# Patient Record
Sex: FEMALE | URBAN - METROPOLITAN AREA
[De-identification: names, ages, dates, MRNs, and addresses within clinical notes are randomized per-mention and may not be internally consistent; named-entity substitution may affect disease eponyms.]

---

## 2022-08-02 ENCOUNTER — LAB REQUISITION (OUTPATIENT)
Dept: LAB | Age: 32
End: 2022-08-02

## 2022-08-02 PROCEDURE — 86480 TB TEST CELL IMMUN MEASURE: CPT | Performed by: CLINICAL MEDICAL LABORATORY

## 2022-08-02 PROCEDURE — PSEU9049 QUANTIFERON TB PLUS: Performed by: CLINICAL MEDICAL LABORATORY

## 2022-08-04 LAB
GAMMA INTERFERON BACKGROUND BLD IA-ACNC: 0.12 IU/ML
M TB IFN-G BLD-IMP: POSITIVE
M TB IFN-G CD4+ BCKGRND COR BLD-ACNC: 1.09 IU/ML
M TB IFN-G CD4+CD8+ BCKGRND COR BLD-ACNC: 1.1 IU/ML
MITOGEN IGNF BCKGRD COR BLD-ACNC: >10 IU/ML

## 2025-04-10 ENCOUNTER — HOSPITAL ENCOUNTER (OUTPATIENT)
Age: 35
Discharge: HOME | End: 2025-04-10
Payer: COMMERCIAL

## 2025-04-10 VITALS
HEIGHT: 65 IN | OXYGEN SATURATION: 98 % | HEART RATE: 97 BPM | BODY MASS INDEX: 43.32 KG/M2 | SYSTOLIC BLOOD PRESSURE: 144 MMHG | RESPIRATION RATE: 16 BRPM | WEIGHT: 260 LBS | DIASTOLIC BLOOD PRESSURE: 86 MMHG | TEMPERATURE: 99 F

## 2025-04-10 DIAGNOSIS — N61.0 CELLULITIS OF LEFT BREAST: Primary | ICD-10-CM

## 2025-04-10 LAB — B-HCG UR QL: NEGATIVE

## 2025-04-10 PROCEDURE — 96365 THER/PROPH/DIAG IV INF INIT: CPT

## 2025-04-10 PROCEDURE — 96375 TX/PRO/DX INJ NEW DRUG ADDON: CPT

## 2025-04-10 PROCEDURE — 81025 URINE PREGNANCY TEST: CPT

## 2025-04-10 PROCEDURE — 99204 OFFICE O/P NEW MOD 45 MIN: CPT

## 2025-04-10 RX ORDER — CEPHALEXIN 500 MG/1
500 CAPSULE ORAL 4 TIMES DAILY
Qty: 40 CAPSULE | Refills: 0 | Status: SHIPPED | OUTPATIENT
Start: 2025-04-10 | End: 2025-04-20

## 2025-04-10 RX ORDER — KETOROLAC TROMETHAMINE 30 MG/ML
15 INJECTION, SOLUTION INTRAMUSCULAR; INTRAVENOUS ONCE
Status: COMPLETED | OUTPATIENT
Start: 2025-04-10 | End: 2025-04-10

## 2025-04-10 RX ORDER — CHLORHEXIDINE GLUCONATE 40 MG/ML
30 SOLUTION TOPICAL
Qty: 1 EACH | Refills: 0 | Status: SHIPPED | OUTPATIENT
Start: 2025-04-10

## 2025-04-10 RX ORDER — ALBUTEROL SULFATE 90 UG/1
INHALANT RESPIRATORY (INHALATION) EVERY 6 HOURS PRN
COMMUNITY

## 2025-04-10 RX ORDER — CLONAZEPAM 0.5 MG/1
0.5 TABLET ORAL AS NEEDED
COMMUNITY
Start: 2023-04-06

## 2025-04-10 NOTE — ED PROVIDER NOTES
Patient Seen in: [facility]    History     Chief Complaint   Patient presents with    Breast Problem     Stated Complaint: Breast Lump    Subjective:   HPI    35-year-old female here with complaint of redness and swelling to an aspect of her right breast that started on Monday.  Patient denies any injury trauma to the area.  Patient denies any breast-feeding etc.  Patient states that her  grandmother had breast  cancer in her 60s and her aunt had breast cancer in her 40s.  Patient was tested for BRCA gene which was negative.  Patient denies chest pain, shortness of breath, cough, abdominal pain, nausea, vomiting or diarrhea.  Patient is tolerating p.o. speaking full sentences.  Afebrile.      Objective:     Past Medical History:    Anxiety    Asthma (HCC)              History reviewed. No pertinent surgical history.           The patient's medication list, past medical history and social history elements  as listed in today's nurse's notes are reviewed and agree.   The patient's family history is reviewed and is noncontributory to the presenting problem, except as indicated as above.     Social History     Socioeconomic History    Marital status:    Tobacco Use    Smoking status: Never    Smokeless tobacco: Never   Vaping Use    Vaping status: Never Used   Substance and Sexual Activity    Alcohol use: Yes     Comment: social    Drug use: Never              Review of Systems    Positive for stated complaint: Breast Lump  Other systems are as noted in HPI.  Constitutional and vital signs reviewed.      All other systems reviewed and negative except as noted above.    Physical Exam     ED Triage Vitals [04/10/25 1806]   /86   Pulse 97   Resp 16   Temp 99 °F (37.2 °C)   Temp src Oral   SpO2 98 %   O2 Device None (Room air)       Current Vitals:   Vital Signs  BP: 144/86  Pulse: 97  Resp: 16  Temp: 99 °F (37.2 °C)  Temp src: Oral    Oxygen Therapy  SpO2: 98 %  O2 Device: None (Room  air)        Physical Exam  Vitals and nursing note reviewed. Exam conducted with a chaperone present.   Constitutional:       Appearance: Normal appearance. She is well-developed.   HENT:      Head: Normocephalic.      Right Ear: External ear normal.      Left Ear: External ear normal.      Nose: Nose normal.      Mouth/Throat:      Mouth: Mucous membranes are moist.   Eyes:      Conjunctiva/sclera: Conjunctivae normal.      Pupils: Pupils are equal, round, and reactive to light.   Cardiovascular:      Rate and Rhythm: Normal rate and regular rhythm.      Heart sounds: Normal heart sounds.   Pulmonary:      Effort: Pulmonary effort is normal.      Breath sounds: Normal breath sounds.   Chest:   Breasts:     Right: Normal. No mass.      Left: Swelling, mass and tenderness present. No nipple discharge.          Comments: L breast: erythema/induration: no fluctuance or streaking noted: at approx 7-9 o'clock  Musculoskeletal:      Cervical back: Normal range of motion and neck supple.   Lymphadenopathy:      Upper Body:      Right upper body: No axillary adenopathy.      Left upper body: Axillary adenopathy present.   Skin:     General: Skin is warm.      Capillary Refill: Capillary refill takes less than 2 seconds.   Neurological:      General: No focal deficit present.      Mental Status: She is alert and oriented to person, place, and time.   Psychiatric:         Mood and Affect: Mood normal.         Behavior: Behavior normal.         Thought Content: Thought content normal.         Judgment: Judgment normal.           ED Course     Labs Reviewed   POCT PREGNANCY URINE - Normal              NOTE: This appears to be cellulitis we discussed the fact that inflammatory breast cancer is always in the differential.                   MDM   Clinical Impression:  :L breast cellulitis  Course of Treatment:   Use the hibiclens scrub twice weekly.  Take the full course of oral antibiotics as prescribed.  If symptoms persist or  worsen i.e. increasing redness swelling fevers streaking or drainage go directly to the emergency room for further evaluation and treatment.  Otherwise follow-up with your primary care physician for outpatient mammogram.    The patient is encouraged to return if any concerning symptoms arise. Additional verbal discharge instructions are given and discussed. Discharge medications are discussed. The patient is in good condition throughout the visit today and remains so upon discharge. I discuss the plan of care with the patient, who expresses understanding. All questions and concerns are addressed to the patient's satisfaction prior to discharge today.  Previous conversations with PCP and charts were reviewed.                Disposition and Plan     Clinical Impression:  1. Cellulitis of left breast         Disposition:  Discharge  4/10/2025  7:38 pm    Follow-up:  Ana Maria Choi  [address] [address]  [phone]                Medications Prescribed:  Current Discharge Medication List        START taking these medications    Details   chlorhexidine 4 % External Solution Apply 30 mL topically twice a week.  Qty: 1 each, Refills: 0      cephALEXin 500 MG Oral Cap Take 1 capsule (500 mg total) by mouth 4 (four) times daily for 10 days.  Qty: 40 capsule, Refills: 0             Supplementary Documentation:

## 2025-04-10 NOTE — DISCHARGE INSTRUCTIONS
Please return to the ER/clinic if symptoms worsen. Follow-up with your PCP in 24-48 hours as needed.    Use the hibiclens scrub twice weekly.  Take the full course of oral antibiotics as prescribed.  If symptoms persist or worsen i.e. increasing redness swelling fevers streaking or drainage go directly to the emergency room for further evaluation and treatment.  Otherwise follow-up with your primary care physician for outpatient mammogram.

## 2025-04-18 ENCOUNTER — OFFICE VISIT (OUTPATIENT)
Dept: SURGERY | Facility: CLINIC | Age: 35
End: 2025-04-18
Payer: COMMERCIAL

## 2025-04-18 ENCOUNTER — OFFICE VISIT (OUTPATIENT)
Dept: INTERNAL MEDICINE CLINIC | Facility: CLINIC | Age: 35
End: 2025-04-18
Payer: COMMERCIAL

## 2025-04-18 VITALS
BODY MASS INDEX: 42.12 KG/M2 | RESPIRATION RATE: 16 BRPM | SYSTOLIC BLOOD PRESSURE: 108 MMHG | HEART RATE: 75 BPM | WEIGHT: 252.81 LBS | DIASTOLIC BLOOD PRESSURE: 78 MMHG | OXYGEN SATURATION: 97 % | TEMPERATURE: 98 F | HEIGHT: 65 IN

## 2025-04-18 DIAGNOSIS — Z80.3 FAMILY HISTORY OF BREAST CANCER: ICD-10-CM

## 2025-04-18 DIAGNOSIS — N61.1 BREAST ABSCESS: Primary | ICD-10-CM

## 2025-04-18 DIAGNOSIS — N63.20 MASS OF LEFT BREAST, UNSPECIFIED QUADRANT: ICD-10-CM

## 2025-04-18 PROCEDURE — 87076 CULTURE ANAEROBE IDENT EACH: CPT | Performed by: SURGERY

## 2025-04-18 PROCEDURE — 3008F BODY MASS INDEX DOCD: CPT | Performed by: INTERNAL MEDICINE

## 2025-04-18 PROCEDURE — 99203 OFFICE O/P NEW LOW 30 MIN: CPT | Performed by: INTERNAL MEDICINE

## 2025-04-18 PROCEDURE — 87075 CULTR BACTERIA EXCEPT BLOOD: CPT | Performed by: SURGERY

## 2025-04-18 PROCEDURE — 3074F SYST BP LT 130 MM HG: CPT | Performed by: INTERNAL MEDICINE

## 2025-04-18 PROCEDURE — 87205 SMEAR GRAM STAIN: CPT | Performed by: SURGERY

## 2025-04-18 PROCEDURE — 3078F DIAST BP <80 MM HG: CPT | Performed by: INTERNAL MEDICINE

## 2025-04-18 PROCEDURE — 87070 CULTURE OTHR SPECIMN AEROBIC: CPT | Performed by: SURGERY

## 2025-04-18 RX ORDER — HYDROCHLOROTHIAZIDE 25 MG/1
25 TABLET ORAL DAILY
COMMUNITY

## 2025-04-18 NOTE — PATIENT INSTRUCTIONS
It will be easiest to remove the packing in the shower  Replace the packing with clean and dry gauze about every 24 hours  The key is to keep the skin open. If possible, guide the gauze toward the area of abscess cavity using cutip or dull tweezers  Keep the outer dressing clean and dry  Take tylenol/motrin for pain, can use an ice pack  You can take up to 4000 mg of Tylenol/acetaminophen every 24 hours.  This works out to be about 1000 mg every 6 hours.  You can take up to 600 mg ibuprofen/Motrin every 6 hours  If you notice rapid soaking through dressings, please call us as soon as possible   Call for increasing redness, fevers, chills  Complete your antibiotic course

## 2025-04-18 NOTE — PROGRESS NOTES
[facility] Internal Medicine Office Note  Chief Complaint:   Chief Complaint   Patient presents with        New Patient       HPI:   This is a 35 year old female coming in for establishing care and breast abscess  HPI    PMH:  Anxiety takes clonazepam as needed. Last used it 6 months ago. She used to see a psychiatrist     Asthma - albuterol as needed. Hasn't used in years    Hydrochlorothiazide as needed for swelling - usually just uses it for travel    Abnormal pap smear at age 20 and has been normal since that time     PSH:   [place] tooth extraction     Breast abscess left breast - started Monday 4/7/25. She felt a sharp pain and felt a lump. She noticed redness on the skin. She had a dose of IV antibiotic and started on cephalexin.   She had fever but not above 100 she notes. Highest temp 99.8 was about a week ago.   On Tuesday 3 days ago, it started draining on its own.   Quarter sized lump when it started but it is smaller in size.   No chills/sweats or fever in past 3-4 days.   On cephalexin 500mg 4x daily, having mild diarrhea but she notes she tolerates ok     No history of breastfeeding.   She notes a history of breast cancer in her family   Her BRCA testing was negative. Done through her primary doctor     Paternal aunt - breast cancer in age early 40s  2 paternal first cousins in late 40s diagnosed with breast cancer         Problem List[1]  Past Surgical History[2]  Family History[3]     I reviewed her's Past Medical History, Past Surgical History, Family History and   Social History updated shows  Short Social Hx on File[4]  Allergies:  Allergies[5]  Current Medications[6]      REVIEW OF SYSTEMS:   Review of Systems   Constitutional: Negative.  Negative for fever.   HENT: Negative.  Negative for congestion.    Eyes: Negative.  Negative for visual disturbance.   Respiratory: Negative.  Negative for shortness of breath.    Cardiovascular: Negative.  Negative for chest pain.   Gastrointestinal:  Negative.  Negative for constipation.   Genitourinary: Negative.  Negative for dysuria.   Musculoskeletal: Negative.    Skin: Negative.    Neurological: Negative.    Hematological: Negative.    Psychiatric/Behavioral: Negative.          EXAM:   /78   Pulse 75   Temp 97.9 °F (36.6 °C) (Temporal)   Resp 16   Ht 5' 5\" (1.651 m)   Wt 252 lb 12.8 oz (114.7 kg)   SpO2 97%   BMI 42.07 kg/m²  Estimated body mass index is 42.07 kg/m² as calculated from the following:    Height as of this encounter: 5' 5\" (1.651 m).    Weight as of this encounter: 252 lb 12.8 oz (114.7 kg).   Vital signs reviewed. Appears stated age, well groomed.  Physical Exam  Vitals reviewed.   Constitutional:       General: She is not in acute distress.     Appearance: She is well-developed.   HENT:      Head: Normocephalic and atraumatic.   Eyes:      Conjunctiva/sclera: Conjunctivae normal.   Cardiovascular:      Rate and Rhythm: Normal rate and regular rhythm.      Heart sounds: Normal heart sounds.   Pulmonary:      Effort: Pulmonary effort is normal.      Breath sounds: Normal breath sounds.   Musculoskeletal:      Cervical back: Neck supple.   Skin:     General: Skin is warm and dry.   Neurological:      Mental Status: She is alert.      Approx 6x5cm breast abscess left breast lower right quadrant     ASSESSMENT AND PLAN:   Nidhi Roblero is a 35 year old female with  1. Breast abscess    2. Mass of left breast, unspecified quadrant    3. Family history of breast cancer          The plan is as follows  Nidhi was seen today for physical and new patient.    Diagnoses and all orders for this visit:    Breast abscess - continue cephalexin. Needs incision and drainage. Spoke with breast surgeon office and they will check with doctor about fitting her in today.     Mass of left breast, unspecified quadrant - c/w abscess. Plan for mammo when symptoms resolved in a few weeks   -     [facility] 2D+3D DIAGNOSTIC OZZIE RAJAN (EYE=[id number]);  Future  -     Surg Onc Breast Referral - In Network    Family history of breast cancer  -     Genetic Counselor Referral - [facility] & Refills for this Visit:  Requested Prescriptions      No prescriptions requested or ordered in this encounter       Imaging & Consults:  OP REFERRAL TO SURGICAL ONCOLOGY  OP REFERRAL TO GENETIC COUNSELOR  OZZIE BRANNON 2D+3D DIAGNOSTIC OZZIE RAJAN (FMS=[id number])    Health Maintenance Due   Topic Date Due    Annual Physical  Never done    DTaP,Tdap,and Td Vaccines (1 - Tdap) Never done    Pap Smear  Never done    COVID-19 Vaccine (3 - 2024-25 season) 09/01/2024     Patient/Caregiver Education: Patient/Caregiver Education: There are no barriers to learning. Medical education done. Outcome: Patient verbalizes understanding. Patient is notified to call with any questions, complications, allergies, or worsening or changing symptoms.  Patient is to call with any side effects or complications from the treatments as a result of today.     Ericka Hopper MD       [1] There is no problem list on file for this patient.   [2] History reviewed. No pertinent surgical history.  [3]   Family History  Problem Relation Age of Onset    Asthma Father     Dementia Maternal Grandmother     Cancer Paternal Grandmother    [4]   Social History  Socioeconomic History    Marital status:    Tobacco Use    Smoking status: Never    Smokeless tobacco: Never   Vaping Use    Vaping status: Never Used   Substance and Sexual Activity    Alcohol use: Yes     Comment: social    Drug use: Never     Social Drivers of Health     Food Insecurity: No Food Insecurity (4/18/2025)    NCSS - Food Insecurity     Worried About Running Out of Food in the Last Year: No     Ran Out of Food in the Last Year: No   Transportation Needs: No Transportation Needs (4/18/2025)    NCSS - Transportation     Lack of Transportation: No   Housing Stability: Not At Risk (4/18/2025)    NCSS - Housing/Utilities     Has Housing: Yes      Worried About Losing Housing: No     Unable to Get Utilities: No   [5] No Known Allergies  [6]   Current Outpatient Medications   Medication Sig Dispense Refill    hydroCHLOROthiazide 25 MG Oral Tab Take 1 tablet (25 mg total) by mouth daily. (Patient taking differently: Take 1 tablet (25 mg total) by mouth as needed.)      clonazePAM 0.5 MG Oral Tab Take 1 tablet (0.5 mg total) by mouth as needed.      albuterol 108 (90 Base) MCG/ACT Inhalation Aero Soln Inhale into the lungs every 6 (six) hours as needed for Wheezing.      chlorhexidine 4 % External Solution Apply 30 mL topically twice a week. 1 each 0    cephALEXin 500 MG Oral Cap Take 1 capsule (500 mg total) by mouth 4 (four) times daily for 10 days. 40 capsule 0

## 2025-04-18 NOTE — CONSULTS
Breast Surgery New Patient Consultation    Nidhi Roblero is a 35 year old patient, referred by Dr. Hpoper, who presents for evaluation of left breast abscess.    History of Present Illness:   Ms. Nidhi Roblero is a 35 year old woman  who presents for evaluation of left breast abscess.     Almost 2 weeks ago the patient felt a pain in her right breast and a mass. She saw redness and went to urgent care. She was given IV antibiotics and given oral antibiotic prescription on 4/10.  Since then her erythema has stayed the same, but she has still has some pain.  The other day, the area started draining spontaneously.  She finally got in with a PCP today and was referred to see me for possible abscess drainage.    She does not take blood thinners.  She has not had any recent trauma or instrumentation to the breast.  She is not recently breast-feeding.    She denies any nipple discharge or axillary adenopathy. She does not have significant past history for breast diseases or breast biopsy.  Her paternal grandmother had breast cancer in her 60s.  Her paternal aunt had breast cancer at age 42.  She has 2 cousins with breast cancer in their late 40s.          Past Medical History[1]    Past Surgical History[2]    Gynecological History:  Menarche at age 12 and LMP n/a due to IUD  Pt is a   Pt was n/a years old at time of first pregnancy.    She denies any cumulative breastfeeding history   Age of Menopause: n/a  Type: n/a  She denies any history of hormone replacement therapy   She denies any history of oral contraceptive use. She has mirena IUD  She denies infertility treatment to achieve pregnancy.    Medications:    Current Medications[3]    Allergies:    Allergies[4]    Family History:   Family History[5]    She is not of Ashkenazi Religious ancestry.    Social History:  History   Alcohol Use    Yes     Comment: social       History   Smoking Status    Never   Smokeless Tobacco    Never       Review of  Systems:    Review of Systems   Constitutional:  Negative for chills and fever.   HENT:  Negative for sore throat and trouble swallowing.    Eyes:  Negative for visual disturbance.   Respiratory:  Negative for cough, chest tightness and shortness of breath.    Cardiovascular:  Negative for chest pain, palpitations and leg swelling.   Gastrointestinal:  Negative for nausea, vomiting, abdominal pain, diarrhea, constipation and blood in stool.   Genitourinary:  Negative for dysuria, hematuria and difficulty urinating.   Skin:  Negative for rash.   Neurological:  Negative for numbness and headaches.      Otherwise as per HPI.    Physical Exam:    There were no vitals taken for this visit.    Physical Exam  Vitals reviewed.   Constitutional:       Appearance: Normal appearance.   HENT:      Head: Normocephalic and atraumatic.   Eyes:      Extraocular Movements: Extraocular movements intact.      Pupils: Pupils are equal, round, and reactive to light.   Cardiovascular:      Rate and Rhythm: Normal rate.   Pulmonary:      Effort: Pulmonary effort is normal.   Chest:   Breasts:     Right: Normal. No mass, nipple discharge, skin change or tenderness.      Left: Swelling and skin change present. No mass, nipple discharge or tenderness.       Abdominal:      General: Abdomen is flat.      Palpations: Abdomen is soft.   Musculoskeletal:         General: Normal range of motion.      Cervical back: Normal range of motion and neck supple.   Lymphadenopathy:      Upper Body:      Right upper body: No supraclavicular or axillary adenopathy.      Left upper body: No supraclavicular or axillary adenopathy.   Skin:     General: Skin is warm and dry.   Neurological:      General: No focal deficit present.      Mental Status: She is alert and oriented to person, place, and time.   Psychiatric:         Mood and Affect: Mood normal.          Bra size: XXL    Labs/imaging: reviewed in EPIC    Impression:   Ms. Nidhi Roblero is a 35 year  old woman that presents for left breast abscess    Discussion and Plan:  I had a discussion with the Patient and her  regarding her breast exam.  There was about a 2 cm area of fluctuance and induration.  Incision and drainage was performed, see separate procedure note.         Further imaging:   Mammogram: Patient will need diagnostic mammogram a few weeks after resolution of the abscess, this has been ordered by her PCP      Patient has extensive paternal family history of breast cancer, she may require acquired genetic counseling.  Will discuss at her next visit    Return to clinic: Next Wednesday for wound recheck, appointment provided    She was given ample opportunity for questions and those questions were answered to her satisfaction. She has been encouraged to contact the office with any questions or concerns. This encounter lasted a total of 55 minutes, more than 50% of which was dedicated to the discussion of management options.     Ronaldo Mauricio MD  Breast Surgical Oncology    CC: Dr. Hopper          [1]   Past Medical History:   Anxiety    Asthma (HCC)   [2] No past surgical history on file.  [3]   No outpatient medications have been marked as taking for the 4/18/25 encounter (Office Visit) with Ronaldo Mauricio MD.   [4] No Known Allergies  [5]   Family History  Problem Relation Age of Onset    Asthma Father     Dementia Maternal Grandmother     Cancer Paternal Grandmother     Breast Cancer Paternal Grandmother 60 - 69    Breast Cancer Other 50    Breast Cancer Paternal Aunt 42

## 2025-04-18 NOTE — PROCEDURES
Procedure: incision and drainage, left breast     Indications: Patient with more than 10 days of left breast pain, erythema, and recent spontaneous drainage    Consent: Electronic consent obtained    Details: Skin was prepped using chloraprep. Local anesthetic was injected at the proposed incision site (left breast 7:00, 7 cm from the nipple). Scalpel was used to incise the skin. Upon incision some serous fluid was expressed.  Culture was obtained for aerobic and anaerobic bacteria. Cotton swab was used to probe the cavity. Saline irrigation was performed. Cavity was packed using a corner of gauze. The area was cleaned and a clean pressure dressing was applied. Wound instructions were given.     Ronaldo Mauricio MD  Breast Surgical Oncology

## 2025-04-23 ENCOUNTER — OFFICE VISIT (OUTPATIENT)
Facility: CLINIC | Age: 35
End: 2025-04-23
Payer: COMMERCIAL

## 2025-04-23 VITALS
OXYGEN SATURATION: 96 % | WEIGHT: 255 LBS | DIASTOLIC BLOOD PRESSURE: 87 MMHG | BODY MASS INDEX: 42 KG/M2 | HEART RATE: 110 BPM | SYSTOLIC BLOOD PRESSURE: 127 MMHG

## 2025-04-23 DIAGNOSIS — N61.1 BREAST ABSCESS: Primary | ICD-10-CM

## 2025-04-23 PROCEDURE — 3074F SYST BP LT 130 MM HG: CPT | Performed by: SURGERY

## 2025-04-23 PROCEDURE — 99213 OFFICE O/P EST LOW 20 MIN: CPT | Performed by: SURGERY

## 2025-04-23 PROCEDURE — 3079F DIAST BP 80-89 MM HG: CPT | Performed by: SURGERY

## 2025-04-23 NOTE — PROGRESS NOTES
Breast Surgery Follow up    Nidhi Roblero is a 35 year old patient, referred by Dr. Hopper, who presents for evaluation of left breast abscess.    History of Present Illness:   Ms. Nidhi Roblero is a 35 year old woman  who presents for evaluation of left breast abscess.     Initially, the patient felt a pain in her right breast and a mass. She saw redness and went to urgent care. She was given IV antibiotics and given oral antibiotic prescription on 4/10.  Since then her erythema has stayed the same, but she has still has some pain.  The other day, the area started draining spontaneously.  She finally got in with a PCP today and was referred to see me for possible abscess drainage.      We performed incision and drainage on 4/18 and patient has been packing the wound daily. She states pain is under control with tylenol/motrin. She had some pus one day, otherwise now has some yellow/clear drainage.  Anaerobic cultures were positive, but patient no longer has active infection, no action was taken about the culture results at this time.     Past medical history, surgical history, family history, allergies, and social history were updated as applicable in EPIC, otherwise see prior consultation note.        Of note, her paternal grandmother had breast cancer in her 60s.  Her paternal aunt had breast cancer at age 42.  She has 2 cousins with breast cancer in their late 40s.    Review of Systems:    Review of Systems   Constitutional:  Negative for chills and fever.   HENT:  Negative for sore throat and trouble swallowing.    Eyes:  Negative for visual disturbance.   Respiratory:  Negative for cough, chest tightness and shortness of breath.    Cardiovascular:  Negative for chest pain, palpitations and leg swelling.   Gastrointestinal:  Negative for nausea, vomiting, abdominal pain, diarrhea, constipation and blood in stool.   Genitourinary:  Negative for dysuria, hematuria and difficulty urinating.   Skin:  Negative  for rash.   Neurological:  Negative for numbness and headaches.      Otherwise as per HPI.    Physical Exam:    /87   Pulse 110   Wt 115.7 kg (255 lb)   SpO2 96%   BMI 42.43 kg/m²     Physical Exam  Vitals reviewed.   Constitutional:       Appearance: Normal appearance.   HENT:      Head: Normocephalic and atraumatic.   Eyes:      Extraocular Movements: Extraocular movements intact.      Pupils: Pupils are equal, round, and reactive to light.   Cardiovascular:      Rate and Rhythm: Normal rate.   Pulmonary:      Effort: Pulmonary effort is normal.   Chest:   Breasts:     Right: Normal. No mass, nipple discharge, skin change or tenderness.      Left: No mass, nipple discharge or tenderness.          Comments: No fluctuance or induration   Granulation tissue at the base of the wound  Abdominal:      General: Abdomen is flat.      Palpations: Abdomen is soft.   Musculoskeletal:         General: Normal range of motion.      Cervical back: Normal range of motion and neck supple.   Lymphadenopathy:      Upper Body:      Right upper body: No supraclavicular or axillary adenopathy.      Left upper body: No supraclavicular or axillary adenopathy.   Skin:     General: Skin is warm and dry.   Neurological:      General: No focal deficit present.      Mental Status: She is alert and oriented to person, place, and time.   Psychiatric:         Mood and Affect: Mood normal.          Bra size: XXL    Labs/imaging: reviewed in EPIC    Impression:   Ms. Nidhi Roblero is a 35 year old woman that presents for left breast abscess follow up    Discussion and Plan:  I had a discussion with the Patient about her exam. She has healthy granulation tissue and has been doing a great job packing. She will return in 2-3 weeks for another incision check.       Further imaging:   Mammogram: Patient will need diagnostic mammogram a few weeks after resolution of the abscess, this has been ordered by her PCP    Patient has extensive  paternal family history of breast cancer, she may require acquired genetic counseling.  Will discuss at her next visit    Return to clinic: 2-3 weeks    She was given ample opportunity for questions and those questions were answered to her satisfaction. She has been encouraged to contact the office with any questions or concerns. This encounter lasted a total of 30 minutes, more than 50% of which was dedicated to the discussion of management options.     Ronaldo Mauricio MD  Breast Surgical Oncology    CC: Dr. Hopper

## 2025-04-23 NOTE — PATIENT INSTRUCTIONS
It will be easiest to remove the packing in the shower  Replace the packing with clean and dry gauze about every 24 hours  The key is to keep the skin open. If possible, guide the gauze toward the area of abscess cavity using cutip or dull tweezers  Keep the outer dressing clean and dry  Take tylenol/motrin for pain, can use an ice pack  You can take up to 4000 mg of Tylenol/acetaminophen every 24 hours.  This works out to be about 1000 mg every 6 hours.  You can take up to 600 mg ibuprofen/Motrin every 6 hours  If you notice rapid soaking through dressings, please call us as soon as possible   Call for increasing redness, fevers, chills  Return in 2-3 weeks

## 2025-05-02 ENCOUNTER — PATIENT MESSAGE (OUTPATIENT)
Facility: CLINIC | Age: 35
End: 2025-05-02

## 2025-05-02 DIAGNOSIS — N61.1 BREAST ABSCESS: Primary | ICD-10-CM

## 2025-05-02 RX ORDER — CEFADROXIL 500 MG/1
500 CAPSULE ORAL 2 TIMES DAILY
Qty: 20 CAPSULE | Refills: 0 | Status: SHIPPED | OUTPATIENT
Start: 2025-05-02 | End: 2025-05-12

## 2025-05-02 NOTE — TELEPHONE ENCOUNTER
Patient called back and notified that an antibiotic has been sent in to her pharmacy. Asked her to monitor her symptoms over the weekend. If her symptoms worsen or she begins to develop fevers and/or chills to notify our office. We will see her on Wednesday 5/7/25 as planned unless symptoms worsen. Verbalizes understanding and agrees to plan.

## 2025-05-05 ENCOUNTER — TELEPHONE (OUTPATIENT)
Dept: INTERNAL MEDICINE CLINIC | Facility: CLINIC | Age: 35
End: 2025-05-05

## 2025-05-05 DIAGNOSIS — N63.20 MASS OF LEFT BREAST, UNSPECIFIED QUADRANT: ICD-10-CM

## 2025-05-05 DIAGNOSIS — N61.1 BREAST ABSCESS: Primary | ICD-10-CM

## 2025-05-05 NOTE — PROGRESS NOTES
Breast Surgery Follow up    Nidhi Roblero is a 35 year old patient, referred by Dr. Hopper, who presents for evaluation of left breast abscess.    History of Present Illness:   Ms. Nidhi Roblero is a 35 year old woman  who presents for evaluation of left breast abscess.     Initially, the patient felt a pain in her right breast and a mass. She saw redness and went to urgent care. She was given IV antibiotics and given oral antibiotic prescription on 4/10.  Since then her erythema has stayed the same, but she has still has some pain.  The area did drain spontaneously.    We performed incision and drainage on 4/18 and patient has been packing the wound daily. She states pain is under control with tylenol/motrin. She had some pus one day, otherwise packing was going well.   Anaerobic cultures were positive from the I&D.     The last few days she was having some increased redness and discomfort in the area. She was no longer able to pack the wound at this time. She was prescribed some antibiotics which helped the redness go down. No fevers or chills.     Past medical history, surgical history, family history, allergies, and social history were updated as applicable in EPIC, otherwise see prior consultation note.        Of note, her paternal grandmother had breast cancer in her 60s.  Her paternal aunt had breast cancer at age 42.  She has 2 cousins with breast cancer in their late 40s.    Review of Systems:    Review of Systems   Constitutional:  Negative for chills and fever.   HENT:  Negative for sore throat and trouble swallowing.    Eyes:  Negative for visual disturbance.   Respiratory:  Negative for cough, chest tightness and shortness of breath.    Cardiovascular:  Negative for chest pain, palpitations and leg swelling.   Gastrointestinal:  Negative for nausea, vomiting, abdominal pain, diarrhea, constipation and blood in stool.   Genitourinary:  Negative for dysuria, hematuria and difficulty urinating.   Skin:   Negative for rash.   Neurological:  Negative for numbness and headaches.      Otherwise as per HPI.    Physical Exam:    There were no vitals taken for this visit.    Physical Exam  Vitals reviewed.   Constitutional:       Appearance: Normal appearance.   HENT:      Head: Normocephalic and atraumatic.   Eyes:      Extraocular Movements: Extraocular movements intact.      Pupils: Pupils are equal, round, and reactive to light.   Cardiovascular:      Rate and Rhythm: Normal rate.   Pulmonary:      Effort: Pulmonary effort is normal.   Chest:   Breasts:     Right: Normal. No mass, nipple discharge, skin change or tenderness.      Left: No mass, nipple discharge or tenderness.          Comments: No fluctuance or induration   Well healed I&D wound  Mild erythema, resolving  Abdominal:      General: Abdomen is flat.      Palpations: Abdomen is soft.   Musculoskeletal:         General: Normal range of motion.      Cervical back: Normal range of motion and neck supple.   Lymphadenopathy:      Upper Body:      Right upper body: No supraclavicular or axillary adenopathy.      Left upper body: No supraclavicular or axillary adenopathy.   Skin:     General: Skin is warm and dry.   Neurological:      General: No focal deficit present.      Mental Status: She is alert and oriented to person, place, and time.   Psychiatric:         Mood and Affect: Mood normal.          Bra size: XXL    Labs/imaging: reviewed in EPIC    Impression:   Ms. Nidhi Roblero is a 35 year old woman that presents for left breast abscess follow up    Discussion and Plan:  I had a discussion with the Patient about her exam. Her wound is healed now. Hard to tell if increased redness was due to a new infection or some minor inflammation. She should finish her antibiotics. Mammogram should be performed sometime after 5/20. We discussed this would check for resolution and also make sure there is no suspicious finding there.       Further imaging:   Mammogram:  Patient will need diagnostic mammogram a few weeks after resolution of the abscess, this has been ordered by her PCP    Patient has extensive paternal family history of breast cancer, she may require genetic counseling. Depending on her upcoming mammogram, she may qualify for MRI if she is high risk (would depend on density).     Return to clinic: as needed, to be determined    She was given ample opportunity for questions and those questions were answered to her satisfaction. She has been encouraged to contact the office with any questions or concerns. This encounter lasted a total of 30 minutes, more than 50% of which was dedicated to the discussion of management options.     Ronaldo Mauricio MD  Breast Surgical Oncology    CC: Dr. Hopper

## 2025-05-05 NOTE — TELEPHONE ENCOUNTER
Pt is looking for additional visits to be added to referral for Dr Ronaldo Mauricio   She has a follow up scheduled from her procedure.     Last visit with Dr Mauricio 4/23/25 in Epic

## 2025-05-07 ENCOUNTER — OFFICE VISIT (OUTPATIENT)
Facility: CLINIC | Age: 35
End: 2025-05-07
Payer: COMMERCIAL

## 2025-05-07 DIAGNOSIS — N61.1 BREAST ABSCESS: Primary | ICD-10-CM

## 2025-05-07 PROCEDURE — 99213 OFFICE O/P EST LOW 20 MIN: CPT | Performed by: SURGERY

## 2025-05-07 NOTE — PATIENT INSTRUCTIONS
You have a family history of breast cancer. If interested in genetic testing, please call [phone] to make an appointment with a genetic counselor at your convenience. Dr. Mauricio does recommend genetic counseling and insurance should cover the cost.     Complete your mammogram (already ordered) sometime around May 20th  Finish your course of antibiotics  Call with increased redness, fevers, chills, or increased breast pain

## 2025-05-08 NOTE — TELEPHONE ENCOUNTER
No response from referral department.     Dr. Hopper- Pended surgical oncology, Dr. Mauricio referral. Please sign if agree. TY

## (undated) NOTE — Clinical Note
Thank you for allowing me to care for your patient! Her breast abscess is looking ok. I recommend diagnostic imaging somewhere around 5/20. She also has a strong family history of breast cancer and I recommend genetic testing. I provided the number for her. Thanks, Ronaldo

## (undated) NOTE — Clinical Note
Thank you for allowing me to care for your patient! We performed I&D and got some fluid out, she will pack it, and I will see her on Wednesday or earlier if needed. Thanks for thinking of us! Ronaldo